# Patient Record
Sex: MALE | Race: WHITE | NOT HISPANIC OR LATINO | URBAN - NONMETROPOLITAN AREA
[De-identification: names, ages, dates, MRNs, and addresses within clinical notes are randomized per-mention and may not be internally consistent; named-entity substitution may affect disease eponyms.]

---

## 2018-11-30 ENCOUNTER — OFFICE VISIT (OUTPATIENT)
Dept: URGENT CARE | Facility: PHYSICIAN GROUP | Age: 38
End: 2018-11-30
Payer: COMMERCIAL

## 2018-11-30 VITALS
HEIGHT: 75 IN | OXYGEN SATURATION: 99 % | TEMPERATURE: 97.8 F | RESPIRATION RATE: 14 BRPM | BODY MASS INDEX: 30.84 KG/M2 | SYSTOLIC BLOOD PRESSURE: 120 MMHG | WEIGHT: 248 LBS | HEART RATE: 90 BPM | DIASTOLIC BLOOD PRESSURE: 80 MMHG

## 2018-11-30 DIAGNOSIS — K64.9 HEMORRHOIDS, UNSPECIFIED HEMORRHOID TYPE: ICD-10-CM

## 2018-11-30 PROCEDURE — 99214 OFFICE O/P EST MOD 30 MIN: CPT | Performed by: PHYSICIAN ASSISTANT

## 2018-11-30 RX ORDER — HYDROCORTISONE ACETATE 25 MG/1
25 SUPPOSITORY RECTAL EVERY 12 HOURS
Qty: 10 SUPPOSITORY | Refills: 0 | Status: SHIPPED | OUTPATIENT
Start: 2018-11-30

## 2018-11-30 NOTE — PATIENT INSTRUCTIONS
How to Take a Sitz Bath  A sitz bath is a warm water bath that is taken while you are sitting down. The water should only come up to your hips and should cover your buttocks. Your health care provider may recommend a sitz bath to help you:  · Clean the lower part of your body, including your genital area.  · With itching.  · With pain.  · With sore muscles or muscles that tighten or spasm.  How to take a sitz bath  Take 3-4 sitz baths per day or as told by your health care provider.  1. Partially fill a bathtub with warm water. You will only need the water to be deep enough to cover your hips and buttocks when you are sitting in it.  2. If your health care provider told you to put medicine in the water, follow the directions exactly.  3. Sit in the water and open the tub drain a little.  4. Turn on the warm water again to keep the tub at the correct level. Keep the water running constantly.  5. Soak in the water for 15-20 minutes or as told by your health care provider.  6. After the sitz bath, pat the affected area dry first. Do not rub it.  7. Be careful when you stand up after the sitz bath because you may feel dizzy.  Contact a health care provider if:  · Your symptoms get worse. Do not continue with sitz baths if your symptoms get worse.  · You have new symptoms. Do not continue with sitz baths until you talk with your health care provider.  This information is not intended to replace advice given to you by your health care provider. Make sure you discuss any questions you have with your health care provider.  Document Released: 09/09/2005 Document Revised: 05/17/2017 Document Reviewed: 12/16/2015  [employer] Interactive Patient Education © 2017 [employer]

## 2018-11-30 NOTE — PROGRESS NOTES
"Chief Complaint   Patient presents with   • Hemorrhoids       HISTORY OF PRESENT ILLNESS: Patient is a 38 y.o. male who presents today because he has had a hemorrhoid for about a week.  He has tried over-the-counter medications without improvement.  He has had some mild bleeding on the tissue after he wipes.    There are no active problems to display for this patient.      Allergies:Patient has no known allergies.    Current Outpatient Prescriptions Ordered in [facility]   Medication Sig Dispense Refill   • hydrocortisone (ANUSOL-HC) 25 MG Suppos Insert 1 Suppository in rectum every 12 hours. 10 Suppository 0   • omeprazole (PRILOSEC) 20 MG delayed-release capsule Take 20 mg by mouth every day.     • ibuprofen (MOTRIN) 200 MG Tab Take 200 mg by mouth every 6 hours as needed.       No current Epic-ordered facility-administered medications on file.        No past medical history on file.    Social History   Substance Use Topics   • Smoking status: Never Smoker   • Smokeless tobacco: Never Used   • Alcohol use No       No family status information on file.   No family history on file.    ROS:  Review of Systems   Constitutional: Negative for fever, chills, weight loss and malaise/fatigue.   HENT: Negative for ear pain, nosebleeds, congestion, sore throat and neck pain.    Eyes: Negative for blurred vision.   Respiratory: Negative for cough, sputum production, shortness of breath and wheezing.    Cardiovascular: Negative for chest pain, palpitations, orthopnea and leg swelling.   Gastrointestinal: Negative for heartburn, nausea, vomiting and abdominal pain.   Genitourinary: Negative for dysuria, urgency and frequency.     Exam:  Blood pressure 120/80, pulse 90, temperature 36.6 °C (97.8 °F), temperature source Temporal, resp. rate 14, height 1.905 m (6' 3\"), weight 112.5 kg (248 lb), SpO2 99 %.  General:  Well nourished, well developed male in NAD  Head:Normocephalic, atraumatic  Eyes: PERRLA, EOM within normal limits, no " conjunctival injection, no scleral icterus, visual fields and acuity grossly intact.  Extremities: no clubbing, cyanosis, or edema.  Rectal: Visual inspection of the rectum reveals a 3 cm diameter tender erythematous hemorrhoid in the 7 o'clock position.    Please note that this dictation was created using voice recognition software. I have made every reasonable attempt to correct obvious errors, but I expect that there are errors of grammar and possibly content that I did not discover before finalizing the note.    Assessment/Plan:  1. Hemorrhoids, unspecified hemorrhoid type  hydrocortisone (ANUSOL-HC) 25 MG Suppos   Information given on sitz baths    Followup with primary care in the next 7-10 days if not significantly improving, return to the urgent care or go to the emergency room sooner for any worsening of symptoms.